# Patient Record
Sex: MALE | Race: WHITE | NOT HISPANIC OR LATINO | Employment: UNEMPLOYED | URBAN - METROPOLITAN AREA
[De-identification: names, ages, dates, MRNs, and addresses within clinical notes are randomized per-mention and may not be internally consistent; named-entity substitution may affect disease eponyms.]

---

## 2023-09-20 ENCOUNTER — APPOINTMENT (OUTPATIENT)
Dept: RADIOLOGY | Facility: MEDICAL CENTER | Age: 38
End: 2023-09-20
Attending: EMERGENCY MEDICINE
Payer: COMMERCIAL

## 2023-09-20 ENCOUNTER — HOSPITAL ENCOUNTER (OUTPATIENT)
Facility: MEDICAL CENTER | Age: 38
End: 2023-09-21
Attending: EMERGENCY MEDICINE | Admitting: STUDENT IN AN ORGANIZED HEALTH CARE EDUCATION/TRAINING PROGRAM
Payer: COMMERCIAL

## 2023-09-20 DIAGNOSIS — K92.2 UPPER GI BLEED: ICD-10-CM

## 2023-09-20 PROBLEM — I10 HYPERTENSION: Status: ACTIVE | Noted: 2023-09-20

## 2023-09-20 PROBLEM — R00.0 TACHYCARDIA: Status: ACTIVE | Noted: 2023-09-20

## 2023-09-20 PROBLEM — E05.90 HYPERTHYROIDISM: Status: ACTIVE | Noted: 2023-09-20

## 2023-09-20 LAB
ALBUMIN SERPL BCP-MCNC: 4.5 G/DL (ref 3.2–4.9)
ALBUMIN/GLOB SERPL: 1.4 G/DL
ALP SERPL-CCNC: 182 U/L (ref 30–99)
ALT SERPL-CCNC: 34 U/L (ref 2–50)
ANION GAP SERPL CALC-SCNC: 10 MMOL/L (ref 7–16)
AST SERPL-CCNC: 33 U/L (ref 12–45)
BASOPHILS # BLD AUTO: 0.4 % (ref 0–1.8)
BASOPHILS # BLD AUTO: 0.5 % (ref 0–1.8)
BASOPHILS # BLD: 0.02 K/UL (ref 0–0.12)
BASOPHILS # BLD: 0.03 K/UL (ref 0–0.12)
BILIRUB SERPL-MCNC: 0.7 MG/DL (ref 0.1–1.5)
BUN SERPL-MCNC: 30 MG/DL (ref 8–22)
CALCIUM ALBUM COR SERPL-MCNC: 9.4 MG/DL (ref 8.5–10.5)
CALCIUM SERPL-MCNC: 9.8 MG/DL (ref 8.5–10.5)
CHLORIDE SERPL-SCNC: 103 MMOL/L (ref 96–112)
CO2 SERPL-SCNC: 24 MMOL/L (ref 20–33)
CREAT SERPL-MCNC: 0.71 MG/DL (ref 0.5–1.4)
EOSINOPHIL # BLD AUTO: 0.13 K/UL (ref 0–0.51)
EOSINOPHIL # BLD AUTO: 0.17 K/UL (ref 0–0.51)
EOSINOPHIL NFR BLD: 2.4 % (ref 0–6.9)
EOSINOPHIL NFR BLD: 2.6 % (ref 0–6.9)
ERYTHROCYTE [DISTWIDTH] IN BLOOD BY AUTOMATED COUNT: 39.6 FL (ref 35.9–50)
ERYTHROCYTE [DISTWIDTH] IN BLOOD BY AUTOMATED COUNT: 39.6 FL (ref 35.9–50)
GFR SERPLBLD CREATININE-BSD FMLA CKD-EPI: 121 ML/MIN/1.73 M 2
GLOBULIN SER CALC-MCNC: 3.2 G/DL (ref 1.9–3.5)
GLUCOSE SERPL-MCNC: 106 MG/DL (ref 65–99)
HCT VFR BLD AUTO: 41.9 % (ref 42–52)
HCT VFR BLD AUTO: 45.5 % (ref 42–52)
HCT VFR BLD AUTO: 48 % (ref 42–52)
HGB BLD-MCNC: 14.3 G/DL (ref 14–18)
HGB BLD-MCNC: 14.8 G/DL (ref 14–18)
HGB BLD-MCNC: 16.6 G/DL (ref 14–18)
IMM GRANULOCYTES # BLD AUTO: 0.01 K/UL (ref 0–0.11)
IMM GRANULOCYTES # BLD AUTO: 0.01 K/UL (ref 0–0.11)
IMM GRANULOCYTES NFR BLD AUTO: 0.2 % (ref 0–0.9)
IMM GRANULOCYTES NFR BLD AUTO: 0.2 % (ref 0–0.9)
LIPASE SERPL-CCNC: 37 U/L (ref 11–82)
LYMPHOCYTES # BLD AUTO: 2.04 K/UL (ref 1–4.8)
LYMPHOCYTES # BLD AUTO: 2.84 K/UL (ref 1–4.8)
LYMPHOCYTES NFR BLD: 37.5 % (ref 22–41)
LYMPHOCYTES NFR BLD: 43.4 % (ref 22–41)
MCH RBC QN AUTO: 27.8 PG (ref 27–33)
MCH RBC QN AUTO: 28.2 PG (ref 27–33)
MCHC RBC AUTO-ENTMCNC: 34.1 G/DL (ref 32.3–36.5)
MCHC RBC AUTO-ENTMCNC: 34.6 G/DL (ref 32.3–36.5)
MCV RBC AUTO: 81.4 FL (ref 81.4–97.8)
MCV RBC AUTO: 81.6 FL (ref 81.4–97.8)
MONOCYTES # BLD AUTO: 0.43 K/UL (ref 0–0.85)
MONOCYTES # BLD AUTO: 0.6 K/UL (ref 0–0.85)
MONOCYTES NFR BLD AUTO: 7.9 % (ref 0–13.4)
MONOCYTES NFR BLD AUTO: 9.2 % (ref 0–13.4)
NEUTROPHILS # BLD AUTO: 2.81 K/UL (ref 1.82–7.42)
NEUTROPHILS # BLD AUTO: 2.89 K/UL (ref 1.82–7.42)
NEUTROPHILS NFR BLD: 44.1 % (ref 44–72)
NEUTROPHILS NFR BLD: 51.6 % (ref 44–72)
NRBC # BLD AUTO: 0 K/UL
NRBC # BLD AUTO: 0 K/UL
NRBC BLD-RTO: 0 /100 WBC (ref 0–0.2)
NRBC BLD-RTO: 0 /100 WBC (ref 0–0.2)
PLATELET # BLD AUTO: 158 K/UL (ref 164–446)
PLATELET # BLD AUTO: 195 K/UL (ref 164–446)
PMV BLD AUTO: 12 FL (ref 9–12.9)
PMV BLD AUTO: 12.3 FL (ref 9–12.9)
POTASSIUM SERPL-SCNC: 4.7 MMOL/L (ref 3.6–5.5)
PROT SERPL-MCNC: 7.7 G/DL (ref 6–8.2)
RBC # BLD AUTO: 5.15 M/UL (ref 4.7–6.1)
RBC # BLD AUTO: 5.88 M/UL (ref 4.7–6.1)
SODIUM SERPL-SCNC: 137 MMOL/L (ref 135–145)
WBC # BLD AUTO: 5.4 K/UL (ref 4.8–10.8)
WBC # BLD AUTO: 6.5 K/UL (ref 4.8–10.8)

## 2023-09-20 PROCEDURE — 83690 ASSAY OF LIPASE: CPT

## 2023-09-20 PROCEDURE — 74177 CT ABD & PELVIS W/CONTRAST: CPT

## 2023-09-20 PROCEDURE — 96374 THER/PROPH/DIAG INJ IV PUSH: CPT

## 2023-09-20 PROCEDURE — 80053 COMPREHEN METABOLIC PANEL: CPT

## 2023-09-20 PROCEDURE — 36415 COLL VENOUS BLD VENIPUNCTURE: CPT

## 2023-09-20 PROCEDURE — 85014 HEMATOCRIT: CPT

## 2023-09-20 PROCEDURE — C9113 INJ PANTOPRAZOLE SODIUM, VIA: HCPCS | Performed by: EMERGENCY MEDICINE

## 2023-09-20 PROCEDURE — 700105 HCHG RX REV CODE 258: Performed by: EMERGENCY MEDICINE

## 2023-09-20 PROCEDURE — 700117 HCHG RX CONTRAST REV CODE 255: Performed by: EMERGENCY MEDICINE

## 2023-09-20 PROCEDURE — 700111 HCHG RX REV CODE 636 W/ 250 OVERRIDE (IP): Performed by: EMERGENCY MEDICINE

## 2023-09-20 PROCEDURE — 700102 HCHG RX REV CODE 250 W/ 637 OVERRIDE(OP): Performed by: STUDENT IN AN ORGANIZED HEALTH CARE EDUCATION/TRAINING PROGRAM

## 2023-09-20 PROCEDURE — 96375 TX/PRO/DX INJ NEW DRUG ADDON: CPT

## 2023-09-20 PROCEDURE — 700105 HCHG RX REV CODE 258: Performed by: STUDENT IN AN ORGANIZED HEALTH CARE EDUCATION/TRAINING PROGRAM

## 2023-09-20 PROCEDURE — 85018 HEMOGLOBIN: CPT

## 2023-09-20 PROCEDURE — 99285 EMERGENCY DEPT VISIT HI MDM: CPT

## 2023-09-20 PROCEDURE — 770020 HCHG ROOM/CARE - TELE (206)

## 2023-09-20 PROCEDURE — 85025 COMPLETE CBC W/AUTO DIFF WBC: CPT | Mod: 91

## 2023-09-20 PROCEDURE — A9270 NON-COVERED ITEM OR SERVICE: HCPCS | Performed by: STUDENT IN AN ORGANIZED HEALTH CARE EDUCATION/TRAINING PROGRAM

## 2023-09-20 PROCEDURE — 99223 1ST HOSP IP/OBS HIGH 75: CPT | Performed by: STUDENT IN AN ORGANIZED HEALTH CARE EDUCATION/TRAINING PROGRAM

## 2023-09-20 RX ORDER — LOSARTAN POTASSIUM 50 MG/1
50 TABLET ORAL DAILY
COMMUNITY

## 2023-09-20 RX ORDER — METHIMAZOLE 5 MG/1
5 TABLET ORAL 3 TIMES DAILY
COMMUNITY

## 2023-09-20 RX ORDER — AMLODIPINE BESYLATE 10 MG/1
10 TABLET ORAL DAILY
COMMUNITY

## 2023-09-20 RX ORDER — BISACODYL 10 MG
10 SUPPOSITORY, RECTAL RECTAL
Status: DISCONTINUED | OUTPATIENT
Start: 2023-09-20 | End: 2023-09-21 | Stop reason: HOSPADM

## 2023-09-20 RX ORDER — HYDRALAZINE HYDROCHLORIDE 20 MG/ML
10 INJECTION INTRAMUSCULAR; INTRAVENOUS ONCE
Status: DISCONTINUED | OUTPATIENT
Start: 2023-09-20 | End: 2023-09-21

## 2023-09-20 RX ORDER — PANTOPRAZOLE SODIUM 40 MG/10ML
40 INJECTION, POWDER, LYOPHILIZED, FOR SOLUTION INTRAVENOUS 2 TIMES DAILY
Status: DISCONTINUED | OUTPATIENT
Start: 2023-09-21 | End: 2023-09-21 | Stop reason: HOSPADM

## 2023-09-20 RX ORDER — ONDANSETRON 2 MG/ML
4 INJECTION INTRAMUSCULAR; INTRAVENOUS ONCE
Status: COMPLETED | OUTPATIENT
Start: 2023-09-20 | End: 2023-09-20

## 2023-09-20 RX ORDER — OXYCODONE HYDROCHLORIDE 5 MG/1
5 TABLET ORAL EVERY 4 HOURS PRN
Status: DISCONTINUED | OUTPATIENT
Start: 2023-09-20 | End: 2023-09-21

## 2023-09-20 RX ORDER — HYDRALAZINE HYDROCHLORIDE 20 MG/ML
20 INJECTION INTRAMUSCULAR; INTRAVENOUS EVERY 6 HOURS PRN
Status: DISCONTINUED | OUTPATIENT
Start: 2023-09-20 | End: 2023-09-21

## 2023-09-20 RX ORDER — POLYETHYLENE GLYCOL 3350 17 G/17G
1 POWDER, FOR SOLUTION ORAL
Status: DISCONTINUED | OUTPATIENT
Start: 2023-09-20 | End: 2023-09-21 | Stop reason: HOSPADM

## 2023-09-20 RX ORDER — OMEPRAZOLE 20 MG/1
20 CAPSULE, DELAYED RELEASE ORAL DAILY
COMMUNITY

## 2023-09-20 RX ORDER — MORPHINE SULFATE 4 MG/ML
2 INJECTION INTRAVENOUS EVERY 4 HOURS PRN
Status: DISCONTINUED | OUTPATIENT
Start: 2023-09-20 | End: 2023-09-21

## 2023-09-20 RX ORDER — AMOXICILLIN 250 MG
2 CAPSULE ORAL 2 TIMES DAILY
Status: DISCONTINUED | OUTPATIENT
Start: 2023-09-20 | End: 2023-09-21 | Stop reason: HOSPADM

## 2023-09-20 RX ORDER — METHIMAZOLE 5 MG/1
5 TABLET ORAL EVERY 8 HOURS
Status: DISCONTINUED | OUTPATIENT
Start: 2023-09-20 | End: 2023-09-21 | Stop reason: HOSPADM

## 2023-09-20 RX ORDER — PROPRANOLOL HCL 60 MG
60 CAPSULE, EXTENDED RELEASE 24HR ORAL DAILY
COMMUNITY

## 2023-09-20 RX ORDER — SODIUM CHLORIDE, SODIUM LACTATE, POTASSIUM CHLORIDE, CALCIUM CHLORIDE 600; 310; 30; 20 MG/100ML; MG/100ML; MG/100ML; MG/100ML
INJECTION, SOLUTION INTRAVENOUS CONTINUOUS
Status: DISCONTINUED | OUTPATIENT
Start: 2023-09-20 | End: 2023-09-21

## 2023-09-20 RX ADMIN — ONDANSETRON 4 MG: 2 INJECTION INTRAMUSCULAR; INTRAVENOUS at 13:32

## 2023-09-20 RX ADMIN — IOHEXOL 100 ML: 350 INJECTION, SOLUTION INTRAVENOUS at 14:23

## 2023-09-20 RX ADMIN — FAMOTIDINE 20 MG: 10 INJECTION, SOLUTION INTRAVENOUS at 13:32

## 2023-09-20 RX ADMIN — METHIMAZOLE 5 MG: 5 TABLET ORAL at 19:14

## 2023-09-20 RX ADMIN — OXYCODONE HYDROCHLORIDE 5 MG: 5 TABLET ORAL at 20:36

## 2023-09-20 RX ADMIN — SODIUM CHLORIDE, POTASSIUM CHLORIDE, SODIUM LACTATE AND CALCIUM CHLORIDE: 600; 310; 30; 20 INJECTION, SOLUTION INTRAVENOUS at 19:13

## 2023-09-20 RX ADMIN — PANTOPRAZOLE SODIUM 80 MG: 40 INJECTION, POWDER, FOR SOLUTION INTRAVENOUS at 17:46

## 2023-09-20 ASSESSMENT — COGNITIVE AND FUNCTIONAL STATUS - GENERAL
SUGGESTED CMS G CODE MODIFIER MOBILITY: CH
MOBILITY SCORE: 24
SUGGESTED CMS G CODE MODIFIER DAILY ACTIVITY: CH
DAILY ACTIVITIY SCORE: 24

## 2023-09-20 ASSESSMENT — ENCOUNTER SYMPTOMS
VOMITING: 1
DIARRHEA: 0
FLANK PAIN: 0
BACK PAIN: 0
NECK PAIN: 0
ABDOMINAL PAIN: 1
INSOMNIA: 0
DOUBLE VISION: 0
BLOOD IN STOOL: 1
EYE PAIN: 0
CHILLS: 0
NERVOUS/ANXIOUS: 0
DEPRESSION: 0
FEVER: 0
DIZZINESS: 0
WEAKNESS: 0
PHOTOPHOBIA: 0
SINUS PAIN: 0
ORTHOPNEA: 0
NAUSEA: 1
HEADACHES: 0
HALLUCINATIONS: 0
PALPITATIONS: 0
BLURRED VISION: 0

## 2023-09-20 ASSESSMENT — LIFESTYLE VARIABLES
TOTAL SCORE: 0
HAVE PEOPLE ANNOYED YOU BY CRITICIZING YOUR DRINKING: NO
ALCOHOL_USE: NO
EVER HAD A DRINK FIRST THING IN THE MORNING TO STEADY YOUR NERVES TO GET RID OF A HANGOVER: NO
TOTAL SCORE: 0
HAVE YOU EVER FELT YOU SHOULD CUT DOWN ON YOUR DRINKING: NO
CONSUMPTION TOTAL: INCOMPLETE
SUBSTANCE_ABUSE: 0
TOTAL SCORE: 0
EVER FELT BAD OR GUILTY ABOUT YOUR DRINKING: NO
DOES PATIENT WANT TO STOP DRINKING: CANNOT ASSESS

## 2023-09-20 ASSESSMENT — PATIENT HEALTH QUESTIONNAIRE - PHQ9
1. LITTLE INTEREST OR PLEASURE IN DOING THINGS: NOT AT ALL
SUM OF ALL RESPONSES TO PHQ9 QUESTIONS 1 AND 2: 0
2. FEELING DOWN, DEPRESSED, IRRITABLE, OR HOPELESS: NOT AT ALL

## 2023-09-20 ASSESSMENT — PAIN DESCRIPTION - PAIN TYPE
TYPE: ACUTE PAIN

## 2023-09-20 ASSESSMENT — FIBROSIS 4 INDEX: FIB4 SCORE: 1.33

## 2023-09-20 NOTE — ED NOTES
Bedside report from PAIGE RN. Pt resting in [facility] comfortably, on monitor, call light in reach.  Pt is on RA, GCS 15. long-term security officers at bedside.

## 2023-09-20 NOTE — ASSESSMENT & PLAN NOTE
This is the patient's second hospital admission for acute GI bleed.  Patient had an extensive stay at [place] last week.  History as per above in HPI.  GI consulted and aware, plan for scope tomorrow  Pantoprazole 40mg IV BID  H/H [id number] to monitor   Transfuse for hemoglobin < 7.0  Keep NPO  Maintenance IVF

## 2023-09-20 NOTE — ASSESSMENT & PLAN NOTE
Patient presents with a systolic blood pressure between the 160s to 200s.  Hydralazine as needed ordered.  Giving 1 dose stat right now.  Patient was diagnosed with hypertension at outside facility.  He was started on amlodipine 10 mg tablet daily, losartan 50 mg tablets  In the setting of an acute GI bleed, holding off on patient's antihypertensive medications.  Day team to resume when appropriate

## 2023-09-20 NOTE — ED TRIAGE NOTES
"Chief Complaint   Patient presents with    Blood in Vomit     Pt BIB law enforcement from nursing home. Pt started vomiting eun blood Wednesday and has not stopped. Pt was seen at [facility] for same but was \"unable to find anything\" per pt.     BP (!) 202/149   Pulse (!) 104   Temp 35.8 °C (96.5 °F) (Temporal)   Resp 18   Ht 1.829 m (6')   Wt 79.4 kg (175 lb)   SpO2 99%   BMI 23.73 kg/m²     "

## 2023-09-20 NOTE — ED NOTES
Med Rec COMPLETE per DISCHARGE RX ORDER FORM  Allergies Reviewed  Antibiotcs in past 30 days:NOT OUTSIDE HOSPISTAL  Anticoagulant in past 14 days:NO    Pt states he does not take any RX medications at the [facility]    However pt was prescribed medications that have not been dispensed and picked up

## 2023-09-20 NOTE — ED PROVIDER NOTES
"ED Provider Note    CHIEF COMPLAINT  Chief Complaint   Patient presents with    Blood in Vomit     Pt BIB law enforcement from care home. Pt started vomiting eun blood Wednesday and has not stopped. Pt was seen at [facility] for same but was \"unable to find anything\" per pt.       EXTERNAL RECORDS REVIEWED  None available    HPI/ROS  LIMITATION TO HISTORY   None  OUTSIDE HISTORIAN(S):  Accompanying officers provided additional history    Johnathan Carter is a 37 y.o. male who presents for evaluation of reported hematemesis, epigastric pain nausea and vomiting.  Patient is apparently incarcerated at a federal care home in [facility].  Per the officer's report he was hospitalized at [facility] last week.  He underwent extensive evaluation including apparent serial blood test, CT scan of the abdomen pelvis and by their report upper endoscopy which did reveal a gastric diverticulum but no heavy bleeding. he apparently presented there with eun hematemesis.  He was stable for several days and had another episode and therefore was transferred here for higher level of care.  Patient apparently has no known medical or surgical history.  He has no history of extensive alcohol abuse.  He specifically denies any history of aspirin or anticoagulant use.  He does report diffuse 5 out of 10 left upper quadrant discomfort.  He does report dark-colored stool    PAST MEDICAL HISTORY   History of hematemesis    SURGICAL HISTORY  patient denies any surgical history  History of upper endoscopy last week  FAMILY HISTORY  History reviewed. No pertinent family history.  Noncontributory  SOCIAL HISTORY  Social History     Tobacco Use    Smoking status: Former     Types: Cigarettes    Smokeless tobacco: Never   Vaping Use    Vaping Use: Never used   Substance and Sexual Activity    Alcohol use: Not Currently    Drug use: Not Currently    Sexual activity: Not on file       CURRENT MEDICATIONS  Home Medications       Reviewed by Garland FERNANDEZ" CANELO Lucas (Registered Nurse) on 09/20/23 at 1236  Med List Status: Partial     Medication Last Dose Status        Patient Alexis Taking any Medications                           ALLERGIES  No Known Allergies    PHYSICAL EXAM  VITAL SIGNS: BP (!) 160/114   Pulse 93   Temp 35.8 °C (96.5 °F) (Temporal)   Resp 16   Ht 1.829 m (6')   Wt 79.4 kg (175 lb)   SpO2 95%   BMI 23.73 kg/m²    Pulse ox interpretation:I interpret this pulse ox as normal.  Constitutional: Alert and oriented x 3, no acute distress  HEENT: Atraumatic normocephalic, pupils are equal round reactive to light extraocular movements are intact. The nares is clear, external ears are normal, mouth shows moist mucous membranes normal dentition for age  Neck: Supple, no JVD no tracheal deviation  Cardiovascular: Mild tachycardia no murmur rub or gallop 2+ pulses peripherally x4  Thorax & Lungs: No respiratory distress, no wheezes rales or rhonchi, No chest tenderness.   GI: Soft diffuse mild epigastric discomfort in the left side no palpable hernias or masses no peritoneal signs  Skin: Warm dry no acute rash or lesion  Musculoskeletal: Moving all extremities with full range and 5 of 5 strength no acute  deformity  Neurologic: Cranial nerves III through XII are grossly intact no sensory deficit no cerebellar dysfunction   Psychiatric: Anxious        DIAGNOSTIC STUDIES / PROCEDURES      LABS  Results for orders placed or performed during the hospital encounter of 09/20/23   CBC WITH DIFFERENTIAL   Result Value Ref Range    WBC 6.5 4.8 - 10.8 K/uL    RBC 5.88 4.70 - 6.10 M/uL    Hemoglobin 16.6 14.0 - 18.0 g/dL    Hematocrit 48.0 42.0 - 52.0 %    MCV 81.6 81.4 - 97.8 fL    MCH 28.2 27.0 - 33.0 pg    MCHC 34.6 32.3 - 36.5 g/dL    RDW 39.6 35.9 - 50.0 fL    Platelet Count 195 164 - 446 K/uL    MPV 12.0 9.0 - 12.9 fL    Neutrophils-Polys 44.10 44.00 - 72.00 %    Lymphocytes 43.40 (H) 22.00 - 41.00 %    Monocytes 9.20 0.00 - 13.40 %    Eosinophils 2.60 0.00 -  6.90 %    Basophils 0.50 0.00 - 1.80 %    Immature Granulocytes 0.20 0.00 - 0.90 %    Nucleated RBC 0.00 0.00 - 0.20 /100 WBC    Neutrophils (Absolute) 2.89 1.82 - 7.42 K/uL    Lymphs (Absolute) 2.84 1.00 - 4.80 K/uL    Monos (Absolute) 0.60 0.00 - 0.85 K/uL    Eos (Absolute) 0.17 0.00 - 0.51 K/uL    Baso (Absolute) 0.03 0.00 - 0.12 K/uL    Immature Granulocytes (abs) 0.01 0.00 - 0.11 K/uL    NRBC (Absolute) 0.00 K/uL   Comp Metabolic Panel   Result Value Ref Range    Sodium 137 135 - 145 mmol/L    Potassium 4.7 3.6 - 5.5 mmol/L    Chloride 103 96 - 112 mmol/L    Co2 24 20 - 33 mmol/L    Anion Gap 10.0 7.0 - 16.0    Glucose 106 (H) 65 - 99 mg/dL    Bun 30 (H) 8 - 22 mg/dL    Creatinine 0.71 0.50 - 1.40 mg/dL    Calcium 9.8 8.5 - 10.5 mg/dL    Correct Calcium 9.4 8.5 - 10.5 mg/dL    AST(SGOT) 33 12 - 45 U/L    ALT(SGPT) 34 2 - 50 U/L    Alkaline Phosphatase 182 (H) 30 - 99 U/L    Total Bilirubin 0.7 0.1 - 1.5 mg/dL    Albumin 4.5 3.2 - 4.9 g/dL    Total Protein 7.7 6.0 - 8.2 g/dL    Globulin 3.2 1.9 - 3.5 g/dL    A-G Ratio 1.4 g/dL   LIPASE   Result Value Ref Range    Lipase 37 11 - 82 U/L   ESTIMATED GFR   Result Value Ref Range    GFR (CKD-EPI) 121 >60 mL/min/1.73 m 2   CBC WITH DIFFERENTIAL   Result Value Ref Range    WBC 5.4 4.8 - 10.8 K/uL    RBC 5.15 4.70 - 6.10 M/uL    Hemoglobin 14.3 14.0 - 18.0 g/dL    Hematocrit 41.9 (L) 42.0 - 52.0 %    MCV 81.4 81.4 - 97.8 fL    MCH 27.8 27.0 - 33.0 pg    MCHC 34.1 32.3 - 36.5 g/dL    RDW 39.6 35.9 - 50.0 fL    Platelet Count 158 (L) 164 - 446 K/uL    MPV 12.3 9.0 - 12.9 fL    Neutrophils-Polys 51.60 44.00 - 72.00 %    Lymphocytes 37.50 22.00 - 41.00 %    Monocytes 7.90 0.00 - 13.40 %    Eosinophils 2.40 0.00 - 6.90 %    Basophils 0.40 0.00 - 1.80 %    Immature Granulocytes 0.20 0.00 - 0.90 %    Nucleated RBC 0.00 0.00 - 0.20 /100 WBC    Neutrophils (Absolute) 2.81 1.82 - 7.42 K/uL    Lymphs (Absolute) 2.04 1.00 - 4.80 K/uL    Monos (Absolute) 0.43 0.00 - 0.85 K/uL    Eos  (Absolute) 0.13 0.00 - 0.51 K/uL    Baso (Absolute) 0.02 0.00 - 0.12 K/uL    Immature Granulocytes (abs) 0.01 0.00 - 0.11 K/uL    NRBC (Absolute) 0.00 K/uL         RADIOLOGY  I have independently interpreted the diagnostic imaging associated with this visit and am waiting the final reading from the radiologist.   My preliminary interpretation is as follows: No acute process  Radiologist interpretation:   CT-ABDOMEN-PELVIS WITH   Final Result      1.  CT of the abdomen and pelvis within normal limits. No acute abdominal or pelvic disease process is evident.          COURSE & MEDICAL DECISION MAKING    ED Observation Status? Yes; I am placing the patient in to an observation status due to a diagnostic uncertainty as well as therapeutic intensity. Patient placed in observation status at 1:10 PM, 9/20/2023.     Observation plan is as follows: Establish an IV, administer nausea medication and IV Pepcid perform serial hemoglobins as well as imaging studies reviewing case with gastroenterology    Upon Reevaluation, the patient's condition has: not improved; and will be escalated to hospitalization.    Patient discharged from ED Observation status at 1615 (Time) 9/20 (Date).     INITIAL ASSESSMENT, COURSE AND PLAN  Care Narrative:  This is a very pleasant 37-year-old gentleman who was brought in from a [facility] for hematemesis and apparent ongoing GI bleeding.  We were able to obtain records from [facility] but still are awaiting the definitive detailed upper endoscopy report.  Apparently he was admitted there last week underwent CT scan, CT angiogram of the abdomen which was unremarkable.  His hemoglobin remained stable.  He apparently underwent upper endoscopy revealing a gastric diverticulum without active bleeding.  Patient apparently developed some epigastric pain and had some coffee-ground emesis as well as melena.  He was transferred here for higher level of care.  Here he had an extensive evaluation.   His initial hemoglobin is reassuring and high at 16 and CT scan was normal he did have on some ongoing discomfort.  We repeated his hemoglobin without any IV fluids previously given and it did drop around 2.3units.  I reviewed the case with Dr. Dee with GI.  She thinks he may be rebleeding from his gastric diverticulum.  She has recommended hospitalization with the hospitalist service n.p.o. at midnight in anticipation for likely repeat upper endoscopy tomorrow      ADDITIONAL PROBLEM LIST    DISPOSITION AND DISCUSSIONS  I have discussed management of the patient with the following physicians and LITO's: Discussed with Dr. Dee as well as hospitalist    Discussion of management with other QHP or appropriate source(s): None    Escalation of care considered, and ultimately not performed: Consider blood transfusion    Barriers to care at this time, including but not limited to: None    Decision tools and prescription drugs considered including, but not limited to: None    FINAL DIAGNOSIS  Acute upper gastrointestinal hemorrhage       Electronically signed by: Fausto Rondon M.D., 9/20/2023 1:22 PM

## 2023-09-20 NOTE — H&P
"Hospital Medicine History & Physical Note    Date of Service  9/20/2023    Primary Care Physician  No primary care provider on file.    Consultants  GI    Specialist Names: Matteoni    Code Status  Full Code    Chief Complaint  Chief Complaint   Patient presents with    Blood in Vomit     Pt BIB law enforcement from [facility]. Pt started vomiting eun blood Wednesday and has not stopped. Pt was seen at [facility] for same but was \"unable to find anything\" per pt.       History of Presenting Illness  Johnathan Carter is a 37 y.o. male with past medical history of hypertension, hyper thyroidism, recent hospital admission for any acute bleeding gastric diverticulum who presented 9/20/2023 with hematemesis.  According the patient, the patient Was experiencing 1 day history of left-sided abdominal pain that was rating to his back.  He approximately 5 episodes of hematemesis prior to her prior fall.  States that he was coughing up a lot of black clots.  States that during his stay in the emergency department, he was having right bloody bouts of emesis.  He denies any melena, however he did notice some streaks of blood when wiping.  Patient denies any chronic NSAID use.  Denies any history of alcohol abuse.  Denies a history of peptic ulcer disease. Patient went to [facility] last week ( was admitted from last wedneday to this Monday).   Notes from [facility] indicate that he was admited for hematemesis. CT chest was negative for acute process. CT abdomen reealed a gsatric fundus diverticulum. GI was consulted at that time and th patient was found to have a non bleeding gastric diverticulum. Patient was about to get discharged but developed hematemesis again. CTA abdomen was obtained which was negative. Hemoglobin had remained stable despite large abouts of emesis. GI took the patient for repeat EGD and abnormal pink glenatinous material was seen in the fundus without any evidence of bleeding raising suspicionn for exogenous " capsule ingestion. At that time, he was noted to have hyperthrydoisim with TSH undetectable and elevated FT4 of 2.2. Patient was started on propranolol and methimazole. He was noted ot by hypertensive and started on losartan and amlodipine. Cherry has yet to receive these medicaitons.     Patient was worked up in the emergency department.  He did have a drop in his hemoglobin while in the emergency department.  He had several episodes of hematemesis as well.  Currently hemodynamically stable, however he remains to be hypertensive.  ER physician has consulted with GI service on call.  Dr. Randolph recommend PPI, and EGD tomorrow.       I discussed the plan of care with patient.    Review of Systems  Review of Systems   Constitutional:  Negative for chills and fever.   HENT:  Negative for congestion, nosebleeds and sinus pain.    Eyes:  Negative for blurred vision, double vision, photophobia and pain.   Cardiovascular:  Negative for chest pain, palpitations and orthopnea.   Gastrointestinal:  Positive for abdominal pain, blood in stool, nausea and vomiting. Negative for diarrhea and melena.   Genitourinary:  Negative for dysuria, flank pain, frequency, hematuria and urgency.   Musculoskeletal:  Negative for back pain and neck pain.   Skin:  Negative for rash.   Neurological:  Negative for dizziness, weakness and headaches.   Psychiatric/Behavioral:  Negative for depression, hallucinations, substance abuse and suicidal ideas. The patient is not nervous/anxious and does not have insomnia.        Past Medical History   has no past medical history on file.    Surgical History   has no past surgical history on file.     Family History  family history is not on file.   Family history reviewed with patient. There is no family history that is pertinent to the chief complaint.     Social History   reports that he has quit smoking. His smoking use included cigarettes. He has never used smokeless tobacco. He reports that he  does not currently use alcohol. He reports that he does not currently use drugs.    Allergies  Allergies   Allergen Reactions    Peanut Oil        Medications  None       Physical Exam  Temp:  [35.8 °C (96.5 °F)] 35.8 °C (96.5 °F)  Pulse:  [] 93  Resp:  [16-18] 16  BP: (152-202)/() 160/114  SpO2:  [93 %-99 %] 95 %  Blood Pressure: (!) 160/114   Temperature: 35.8 °C (96.5 °F)   Pulse: 93   Respiration: 16   Pulse Oximetry: 95 %       Physical Exam  Constitutional:       General: He is not in acute distress.     Appearance: Normal appearance. He is normal weight. He is not ill-appearing, toxic-appearing or diaphoretic.   HENT:      Head: Normocephalic and atraumatic.      Mouth/Throat:      Mouth: Mucous membranes are moist.   Eyes:      Pupils: Pupils are equal, round, and reactive to light.   Cardiovascular:      Rate and Rhythm: Normal rate and regular rhythm.      Pulses: Normal pulses.      Heart sounds: Normal heart sounds. No murmur heard.     No friction rub. No gallop.   Pulmonary:      Effort: Pulmonary effort is normal. No respiratory distress.      Breath sounds: Normal breath sounds. No stridor. No wheezing, rhonchi or rales.   Chest:      Chest wall: No tenderness.   Abdominal:      General: There is no distension.      Palpations: There is no mass.      Tenderness: There is no abdominal tenderness. There is no right CVA tenderness, left CVA tenderness, guarding or rebound.      Hernia: No hernia is present.   Musculoskeletal:         General: No swelling, tenderness, deformity or signs of injury.      Right lower leg: No edema.      Left lower leg: No edema.   Skin:     General: Skin is warm and dry.      Capillary Refill: Capillary refill takes less than 2 seconds.      Coloration: Skin is not jaundiced or pale.      Findings: No bruising, erythema, lesion or rash.   Neurological:      General: No focal deficit present.      Mental Status: He is alert and oriented to person, place, and  "time. Mental status is at baseline.      Cranial Nerves: No cranial nerve deficit.      Sensory: No sensory deficit.      Motor: No weakness.      Coordination: Coordination normal.      Gait: Gait normal.      Deep Tendon Reflexes: Reflexes normal.   Psychiatric:         Mood and Affect: Mood normal.         Laboratory:  Recent Labs     09/20/23  1239 09/20/23  1540   WBC 6.5 5.4   RBC 5.88 5.15   HEMOGLOBIN 16.6 14.3   HEMATOCRIT 48.0 41.9*   MCV 81.6 81.4   MCH 28.2 27.8   MCHC 34.6 34.1   RDW 39.6 39.6   PLATELETCT 195 158*   MPV 12.0 12.3     Recent Labs     09/20/23  1239   SODIUM 137   POTASSIUM 4.7   CHLORIDE 103   CO2 24   GLUCOSE 106*   BUN 30*   CREATININE 0.71   CALCIUM 9.8     Recent Labs     09/20/23  1239   ALTSGPT 34   ASTSGOT 33   ALKPHOSPHAT 182*   TBILIRUBIN 0.7   LIPASE 37   GLUCOSE 106*         No results for input(s): \"NTPROBNP\" in the last 72 hours.      No results for input(s): \"TROPONINT\" in the last 72 hours.    Imaging:  CT-ABDOMEN-PELVIS WITH   Final Result      1.  CT of the abdomen and pelvis within normal limits. No acute abdominal or pelvic disease process is evident.            Assessment/Plan:  Justification for Admission Status  I anticipate this patient will require at least two midnights for appropriate medical management, necessitating inpatient admission because management of upper GI bleed, requiring every 4 hour monitoring of patient's hemoglobin levels.  Patient will need a GI consult with likely EGD in the morning.    Patient will need a Med/Surg bed on EMERGENCY service .  The need is secondary to GI bleed.    * GI bleed- (present on admission)  Assessment & Plan  This is the patient's second hospital admission for acute GI bleed.  Patient had an extensive stay at [facility] last week.  History as per above in HPI.  GI consulted and aware, plan for scope tomorrow  Pantoprazole 40mg IV BID  H/H [id number] to monitor   Transfuse for hemoglobin < 7.0  Keep NPO  Maintenance " IVF      Hyperthyroidism  Assessment & Plan  Diagnosed at outside facility.  Patient was started on methimazole and propranolol.  Holding off on propranolol at this time the setting of acute GI bleed.  Resuming methimazole 5 mg tablet daily.  Follow-up with TSH levels    Hypertension  Assessment & Plan  Patient presents with a systolic blood pressure between the 160s to 200s.  Hydralazine as needed ordered.  Giving 1 dose stat right now.  Patient was diagnosed with hypertension at outside facility.  He was started on amlodipine 10 mg tablet daily, losartan 50 mg tablets  In the setting of an acute GI bleed, holding off on patient's antihypertensive medications.  Day team to resume when appropriate    Tachycardia  Assessment & Plan  Secondary to acute GI bleed  Monitor H/H  Continue with IVF        VTE prophylaxis: pharmacologic prophylaxis contraindicated due to acute bleed

## 2023-09-20 NOTE — ASSESSMENT & PLAN NOTE
Diagnosed at outside facility.  Patient was started on methimazole and propranolol.  Holding off on propranolol at this time the setting of acute GI bleed.  Resuming methimazole 5 mg tablet daily.  Follow-up with TSH levels

## 2023-09-21 ENCOUNTER — ANESTHESIA EVENT (OUTPATIENT)
Dept: SURGERY | Facility: MEDICAL CENTER | Age: 38
End: 2023-09-21
Payer: COMMERCIAL

## 2023-09-21 ENCOUNTER — ANESTHESIA (OUTPATIENT)
Dept: SURGERY | Facility: MEDICAL CENTER | Age: 38
End: 2023-09-21
Payer: COMMERCIAL

## 2023-09-21 VITALS
SYSTOLIC BLOOD PRESSURE: 146 MMHG | TEMPERATURE: 98.1 F | HEART RATE: 89 BPM | OXYGEN SATURATION: 96 % | HEIGHT: 72 IN | RESPIRATION RATE: 18 BRPM | WEIGHT: 174.82 LBS | BODY MASS INDEX: 23.68 KG/M2 | DIASTOLIC BLOOD PRESSURE: 96 MMHG

## 2023-09-21 PROBLEM — R00.0 TACHYCARDIA: Status: RESOLVED | Noted: 2023-09-20 | Resolved: 2023-09-21

## 2023-09-21 PROBLEM — J39.2 OROPHARYNGEAL BLEEDING: Status: ACTIVE | Noted: 2023-09-20

## 2023-09-21 PROBLEM — I15.2 HYPERTENSION DUE TO ENDOCRINE DISORDER: Status: ACTIVE | Noted: 2023-09-20

## 2023-09-21 LAB
ALBUMIN SERPL BCP-MCNC: 3.7 G/DL (ref 3.2–4.9)
ALBUMIN/GLOB SERPL: 1.4 G/DL
ALP SERPL-CCNC: 151 U/L (ref 30–99)
ALT SERPL-CCNC: 25 U/L (ref 2–50)
ANION GAP SERPL CALC-SCNC: 9 MMOL/L (ref 7–16)
AST SERPL-CCNC: 23 U/L (ref 12–45)
BASOPHILS # BLD AUTO: 0.3 % (ref 0–1.8)
BASOPHILS # BLD: 0.02 K/UL (ref 0–0.12)
BILIRUB SERPL-MCNC: 0.4 MG/DL (ref 0.1–1.5)
BUN SERPL-MCNC: 33 MG/DL (ref 8–22)
CALCIUM ALBUM COR SERPL-MCNC: 9.2 MG/DL (ref 8.5–10.5)
CALCIUM SERPL-MCNC: 9 MG/DL (ref 8.5–10.5)
CHLORIDE SERPL-SCNC: 105 MMOL/L (ref 96–112)
CO2 SERPL-SCNC: 25 MMOL/L (ref 20–33)
CREAT SERPL-MCNC: 0.76 MG/DL (ref 0.5–1.4)
EOSINOPHIL # BLD AUTO: 0.19 K/UL (ref 0–0.51)
EOSINOPHIL NFR BLD: 3 % (ref 0–6.9)
ERYTHROCYTE [DISTWIDTH] IN BLOOD BY AUTOMATED COUNT: 39.8 FL (ref 35.9–50)
GFR SERPLBLD CREATININE-BSD FMLA CKD-EPI: 118 ML/MIN/1.73 M 2
GLOBULIN SER CALC-MCNC: 2.6 G/DL (ref 1.9–3.5)
GLUCOSE SERPL-MCNC: 93 MG/DL (ref 65–99)
HCT VFR BLD AUTO: 41.1 % (ref 42–52)
HCT VFR BLD AUTO: 41.2 % (ref 42–52)
HGB BLD-MCNC: 13.9 G/DL (ref 14–18)
HGB BLD-MCNC: 14.1 G/DL (ref 14–18)
IMM GRANULOCYTES # BLD AUTO: 0.01 K/UL (ref 0–0.11)
IMM GRANULOCYTES NFR BLD AUTO: 0.2 % (ref 0–0.9)
LYMPHOCYTES # BLD AUTO: 3.31 K/UL (ref 1–4.8)
LYMPHOCYTES NFR BLD: 52.5 % (ref 22–41)
MAGNESIUM SERPL-MCNC: 1.9 MG/DL (ref 1.5–2.5)
MCH RBC QN AUTO: 27.8 PG (ref 27–33)
MCHC RBC AUTO-ENTMCNC: 33.8 G/DL (ref 32.3–36.5)
MCV RBC AUTO: 82.2 FL (ref 81.4–97.8)
MONOCYTES # BLD AUTO: 0.58 K/UL (ref 0–0.85)
MONOCYTES NFR BLD AUTO: 9.2 % (ref 0–13.4)
NEUTROPHILS # BLD AUTO: 2.2 K/UL (ref 1.82–7.42)
NEUTROPHILS NFR BLD: 34.8 % (ref 44–72)
NRBC # BLD AUTO: 0 K/UL
NRBC BLD-RTO: 0 /100 WBC (ref 0–0.2)
PHOSPHATE SERPL-MCNC: 4.4 MG/DL (ref 2.5–4.5)
PLATELET # BLD AUTO: 163 K/UL (ref 164–446)
PMV BLD AUTO: 12.2 FL (ref 9–12.9)
POTASSIUM SERPL-SCNC: 4.2 MMOL/L (ref 3.6–5.5)
PROT SERPL-MCNC: 6.3 G/DL (ref 6–8.2)
RBC # BLD AUTO: 5 M/UL (ref 4.7–6.1)
SODIUM SERPL-SCNC: 139 MMOL/L (ref 135–145)
T4 FREE SERPL-MCNC: 2.18 NG/DL (ref 0.93–1.7)
TSH SERPL DL<=0.005 MIU/L-ACNC: <0.005 UIU/ML (ref 0.38–5.33)
WBC # BLD AUTO: 6.3 K/UL (ref 4.8–10.8)

## 2023-09-21 PROCEDURE — 700111 HCHG RX REV CODE 636 W/ 250 OVERRIDE (IP): Performed by: STUDENT IN AN ORGANIZED HEALTH CARE EDUCATION/TRAINING PROGRAM

## 2023-09-21 PROCEDURE — A9270 NON-COVERED ITEM OR SERVICE: HCPCS | Performed by: STUDENT IN AN ORGANIZED HEALTH CARE EDUCATION/TRAINING PROGRAM

## 2023-09-21 PROCEDURE — 85025 COMPLETE CBC W/AUTO DIFF WBC: CPT

## 2023-09-21 PROCEDURE — 700105 HCHG RX REV CODE 258: Performed by: ANESTHESIOLOGY

## 2023-09-21 PROCEDURE — 99239 HOSP IP/OBS DSCHRG MGMT >30: CPT | Performed by: STUDENT IN AN ORGANIZED HEALTH CARE EDUCATION/TRAINING PROGRAM

## 2023-09-21 PROCEDURE — 700102 HCHG RX REV CODE 250 W/ 637 OVERRIDE(OP): Performed by: STUDENT IN AN ORGANIZED HEALTH CARE EDUCATION/TRAINING PROGRAM

## 2023-09-21 PROCEDURE — 85014 HEMATOCRIT: CPT

## 2023-09-21 PROCEDURE — C9113 INJ PANTOPRAZOLE SODIUM, VIA: HCPCS | Performed by: STUDENT IN AN ORGANIZED HEALTH CARE EDUCATION/TRAINING PROGRAM

## 2023-09-21 PROCEDURE — 160035 HCHG PACU - 1ST 60 MINS PHASE I: Performed by: INTERNAL MEDICINE

## 2023-09-21 PROCEDURE — 84100 ASSAY OF PHOSPHORUS: CPT

## 2023-09-21 PROCEDURE — 700111 HCHG RX REV CODE 636 W/ 250 OVERRIDE (IP): Performed by: ANESTHESIOLOGY

## 2023-09-21 PROCEDURE — 160048 HCHG OR STATISTICAL LEVEL 1-5: Performed by: INTERNAL MEDICINE

## 2023-09-21 PROCEDURE — 160009 HCHG ANES TIME/MIN: Performed by: INTERNAL MEDICINE

## 2023-09-21 PROCEDURE — 85018 HEMOGLOBIN: CPT

## 2023-09-21 PROCEDURE — 160002 HCHG RECOVERY MINUTES (STAT): Performed by: INTERNAL MEDICINE

## 2023-09-21 PROCEDURE — 36415 COLL VENOUS BLD VENIPUNCTURE: CPT

## 2023-09-21 PROCEDURE — 84443 ASSAY THYROID STIM HORMONE: CPT

## 2023-09-21 PROCEDURE — 160202 HCHG ENDO MINUTES - 1ST 30 MINS LEVEL 3: Performed by: INTERNAL MEDICINE

## 2023-09-21 PROCEDURE — 84439 ASSAY OF FREE THYROXINE: CPT

## 2023-09-21 PROCEDURE — 99204 OFFICE O/P NEW MOD 45 MIN: CPT | Mod: 25 | Performed by: INTERNAL MEDICINE

## 2023-09-21 PROCEDURE — G0378 HOSPITAL OBSERVATION PER HR: HCPCS

## 2023-09-21 PROCEDURE — 43235 EGD DIAGNOSTIC BRUSH WASH: CPT | Performed by: INTERNAL MEDICINE

## 2023-09-21 PROCEDURE — 80053 COMPREHEN METABOLIC PANEL: CPT

## 2023-09-21 PROCEDURE — 83735 ASSAY OF MAGNESIUM: CPT

## 2023-09-21 RX ORDER — PROPRANOLOL HYDROCHLORIDE 20 MG/1
20 TABLET ORAL 3 TIMES DAILY
Status: DISCONTINUED | OUTPATIENT
Start: 2023-09-21 | End: 2023-09-21 | Stop reason: HOSPADM

## 2023-09-21 RX ORDER — LABETALOL HYDROCHLORIDE 5 MG/ML
INJECTION, SOLUTION INTRAVENOUS PRN
Status: DISCONTINUED | OUTPATIENT
Start: 2023-09-21 | End: 2023-09-21 | Stop reason: SURG

## 2023-09-21 RX ORDER — SODIUM CHLORIDE, SODIUM LACTATE, POTASSIUM CHLORIDE, CALCIUM CHLORIDE 600; 310; 30; 20 MG/100ML; MG/100ML; MG/100ML; MG/100ML
INJECTION, SOLUTION INTRAVENOUS
Status: DISCONTINUED | OUTPATIENT
Start: 2023-09-21 | End: 2023-09-21 | Stop reason: SURG

## 2023-09-21 RX ADMIN — PANTOPRAZOLE SODIUM 40 MG: 40 INJECTION, POWDER, FOR SOLUTION INTRAVENOUS at 05:15

## 2023-09-21 RX ADMIN — PROPRANOLOL HYDROCHLORIDE 20 MG: 20 TABLET ORAL at 11:42

## 2023-09-21 RX ADMIN — PROPOFOL 50 MG: 10 INJECTION, EMULSION INTRAVENOUS at 13:08

## 2023-09-21 RX ADMIN — OXYCODONE HYDROCHLORIDE 5 MG: 5 TABLET ORAL at 08:14

## 2023-09-21 RX ADMIN — SODIUM CHLORIDE, POTASSIUM CHLORIDE, SODIUM LACTATE AND CALCIUM CHLORIDE: 600; 310; 30; 20 INJECTION, SOLUTION INTRAVENOUS at 12:53

## 2023-09-21 RX ADMIN — MORPHINE SULFATE 2 MG: 4 INJECTION, SOLUTION INTRAMUSCULAR; INTRAVENOUS at 04:47

## 2023-09-21 RX ADMIN — PROPOFOL 50 MG: 10 INJECTION, EMULSION INTRAVENOUS at 13:13

## 2023-09-21 RX ADMIN — PROPOFOL 50 MG: 10 INJECTION, EMULSION INTRAVENOUS at 13:05

## 2023-09-21 RX ADMIN — LABETALOL HYDROCHLORIDE 10 MG: 5 INJECTION, SOLUTION INTRAVENOUS at 13:03

## 2023-09-21 RX ADMIN — OXYCODONE HYDROCHLORIDE 5 MG: 5 TABLET ORAL at 03:30

## 2023-09-21 RX ADMIN — PROPOFOL 50 MG: 10 INJECTION, EMULSION INTRAVENOUS at 13:02

## 2023-09-21 ASSESSMENT — PAIN DESCRIPTION - PAIN TYPE
TYPE: SURGICAL PAIN
TYPE: SURGICAL PAIN
TYPE: ACUTE PAIN
TYPE: SURGICAL PAIN
TYPE: ACUTE PAIN

## 2023-09-21 ASSESSMENT — ENCOUNTER SYMPTOMS
NEUROLOGICAL NEGATIVE: 1
RESPIRATORY NEGATIVE: 1
CONSTITUTIONAL NEGATIVE: 1
CARDIOVASCULAR NEGATIVE: 1
ABDOMINAL PAIN: 1
EYES NEGATIVE: 1
MUSCULOSKELETAL NEGATIVE: 1
VOMITING: 1

## 2023-09-21 ASSESSMENT — PAIN SCALES - GENERAL: PAIN_LEVEL: 0

## 2023-09-21 ASSESSMENT — FIBROSIS 4 INDEX: FIB4 SCORE: 1.04

## 2023-09-21 NOTE — CONSULTS
Gastroenterology Initial Consult Note               Author:  Phuong Dee M.D. Date & Time Created: 9/21/2023 6:21 AM       Patient ID:  Name:             Johnathan Carter    YOB: 1985  Age:                 37 y.o.  male  MRN:               [id number]      Referring Provider:  Dr. Fausto Rondon        Presenting Chief Complaint:  hematemesis      History of Present Illness:    RECORDS FROM [facility] NOT AVAILABLE FOR REVIEW AND HAVE NOT BEEN SCANNED IN YET    This is a  37 y.o. male prisoner from [facility] who presented to our ER yesterday with hematemesis.  He gives a 1 day history of left-sided abdominal pain radiating to his back.  In ER yesterday, he was reported to be spitting up small amounts of blood but no melena.  HIs initial Hgb 16.6 and recheck was 14.3 (no IV fluids had been given per ER physician).  This am his Hgb 13.9  His CT abd/pelvis negative.    Per chart review:  He was admitted to  [facility] last week for hematemesis.  He was seen by [facility] and underwent EGD where he was noted to have a nonbleeding gastric diverticulum.  Just prior to discharge 4 days ago, he developed hematemesis again.  CTA of the abdomen was negative and the prior CT of chest and abdomen were negative.  His hemoglobin was stable despite large amounts of emesis.  He went for a second EGD where he had abnormal pink gelatinous material throughout the fundus without any evidence of active bleeding raising suspicion for an exogenous capsule ingestion.  His Hgb at discharge was 14    At [facility] he was found to be hyperthyroid with an undetectable TSH, elevated free T4 and he was started on propranolol and methimazole were noted to be hypertensive and was started on losartan and amlodipine.  Patient has not received those medicines at the time of presentation here.  On presentation his blood pressure in triage was 202/149, pulse 104        Review of  Systems:  Review of Systems   Constitutional: Negative.    HENT: Negative.     Eyes: Negative.    Respiratory: Negative.     Cardiovascular: Negative.    Gastrointestinal:  Positive for abdominal pain and vomiting. Negative for melena.   Genitourinary: Negative.    Musculoskeletal: Negative.    Skin: Negative.    Neurological: Negative.    Endo/Heme/Allergies: Negative.              Past Medical History:  History reviewed. No pertinent past medical history.  Active Hospital Problems    Diagnosis     GI bleed [K92.2]     Tachycardia [R00.0]     Hypertension [I10]     Hyperthyroidism [E05.90]          Past Surgical History:  History reviewed. No pertinent surgical history.        Hospital Medications:  Current Facility-Administered Medications   Medication Dose Frequency Provider Last Rate Last Admin    senna-docusate (Pericolace Or Senokot S) 8.6-50 MG per tablet 2 Tablet  2 Tablet BID Kendrick Rodriguez M.D.        And    polyethylene glycol/lytes (Miralax) PACKET 1 Packet  1 Packet QDAY PRN Kendrick Rodriguez M.D.        And    magnesium hydroxide (Milk Of Magnesia) suspension 30 mL  30 mL QDAY PRN Kendrick Rodriguez M.D.        And    bisacodyl (Dulcolax) suppository 10 mg  10 mg QDAY PRN Kendrick Rodriguez M.D.        lactated ringers infusion   Continuous Kendrick Rodriguez M.D. 100 mL/hr at 09/20/23 1913 New Bag at 09/20/23 1913    pantoprazole (Protonix) injection 40 mg  40 mg BID Kendrick Rodriguez M.D.   40 mg at 09/21/23 0515    hydrALAZINE (Apresoline) injection 20 mg  20 mg Q6HRS PRN Kendrick Rodriguez M.D.        hydrALAZINE (Apresoline) injection 10 mg  10 mg Once Kendrick Rodriguez M.D.        methimazole (Tapazole) tablet 5 mg  5 mg Q8HRS Kendrick Rodriguez M.D.   5 mg at 09/20/23 1914    oxyCODONE immediate-release (Roxicodone) tablet 5 mg  5 mg Q4HRS PRDONY Rodriguez M.D.   5 mg at 09/21/23 0330    morphine 4 MG/ML injection 2 mg  2 mg Q4HRS PRN Kendrick Rodriguez M.D.   2 mg at 09/21/23 0447   Last reviewed on 9/20/2023   7:00 PM by Irma Vazquez, EMT-Advanced       Current Outpatient Medications:  Medications Prior to Admission   Medication Sig Dispense Refill Last Dose    amLODIPine (NORVASC) 10 MG Tab Take 10 mg by mouth every day.   NEW RX at NOT STARTED    losartan (COZAAR) 50 MG Tab Take 50 mg by mouth every day.   NEW RX at NOT STARTED    methimazole (TAPAZOLE) 5 MG Tab Take 5 mg by mouth 3 times a day.   NEW RX at NOT STARTED    omeprazole (PRILOSEC) 20 MG delayed-release capsule Take 20 mg by mouth every day.   NEW RX at NOT STARTED    propranolol LA (INDERAL LA) 60 MG CAPSULE SR 24 HR Take 60 mg by mouth every day.   NEW RX at NOT STARTED         Medication Allergies:  Allergies   Allergen Reactions    Peanut Oil          Family Medical History:  History reviewed. No pertinent family history.      Social History:  Social History     Socioeconomic History    Marital status: Single     Spouse name: Not on file    Number of children: Not on file    Years of education: Not on file    Highest education level: Not on file   Occupational History    Not on file   Tobacco Use    Smoking status: Former     Types: Cigarettes    Smokeless tobacco: Never   Vaping Use    Vaping Use: Never used   Substance and Sexual Activity    Alcohol use: Not Currently    Drug use: Not Currently    Sexual activity: Not on file   Other Topics Concern    Not on file   Social History Narrative    Not on file     Social Determinants of Health     Financial Resource Strain: Not on file   Food Insecurity: Not on file   Transportation Needs: Not on file   Physical Activity: Not on file   Stress: Not on file   Social Connections: Not on file   Intimate Partner Violence: Not on file   Housing Stability: Not on file         Vital signs:  Weight/BMI: Body mass index is 23.71 kg/m².  BP (!) 176/115   Pulse 96   Temp 36.5 °C (97.7 °F) (Temporal)   Resp 18   Ht 1.829 m (6')   Wt 79.3 kg (174 lb 13.2 oz)   SpO2 97%   Vitals:    09/21/23 0330 09/21/23 0400  09/21/23 0447 09/21/23 0520   BP:  (!) 176/115     Pulse:  96     Resp: 20 19 20 18   Temp:  36.5 °C (97.7 °F)     TempSrc:  Temporal     SpO2:       Weight:  79.3 kg (174 lb 13.2 oz)     Height:         Oxygen Therapy:  Pulse Oximetry: 97 %, O2 (LPM): 0, O2 Delivery Device: None - Room Air    Intake/Output Summary (Last 24 hours) at 9/21/2023 0621  Last data filed at 9/21/2023 0400  Gross per 24 hour   Intake 1235.98 ml   Output 440 ml   Net 795.98 ml         Physical Exam:  Physical Exam  Constitutional:       General: He is not in acute distress.     Appearance: Normal appearance. He is normal weight. He is not ill-appearing, toxic-appearing or diaphoretic.   HENT:      Head: Normocephalic and atraumatic.      Nose: Nose normal.      Mouth/Throat:      Mouth: Mucous membranes are moist.   Eyes:      Pupils: Pupils are equal, round, and reactive to light.   Cardiovascular:      Rate and Rhythm: Regular rhythm.      Heart sounds: Normal heart sounds.   Pulmonary:      Effort: Pulmonary effort is normal.      Breath sounds: Normal breath sounds.   Abdominal:      General: Bowel sounds are normal.      Palpations: Abdomen is soft.      Comments: Tender to palpation along left abdomen, no mass appreciated   Musculoskeletal:         General: No swelling.      Cervical back: Neck supple.   Lymphadenopathy:      Cervical: No cervical adenopathy.   Skin:     General: Skin is warm and dry.   Neurological:      Mental Status: He is alert and oriented to person, place, and time.                 Labs:  Recent Labs     09/20/23  1239 09/21/23  0202   SODIUM 137 139   POTASSIUM 4.7 4.2   CHLORIDE 103 105   CO2 24 25   BUN 30* 33*   CREATININE 0.71 0.76   CALCIUM 9.8 9.0     Recent Labs     09/20/23  1239 09/21/23  0202   ALTSGPT 34 25   ASTSGOT 33 23   ALKPHOSPHAT 182* 151*   TBILIRUBIN 0.7 0.4   LIPASE 37  --    GLUCOSE 106* 93     Recent Labs     09/20/23  1239 09/20/23  1540 09/21/23  0202   WBC 6.5 5.4 6.3   NEUTSPOLYS  44.10 51.60 34.80*   LYMPHOCYTES 43.40* 37.50 52.50*   MONOCYTES 9.20 7.90 9.20   EOSINOPHILS 2.60 2.40 3.00   BASOPHILS 0.50 0.40 0.30   ASTSGOT 33  --  23   ALTSGPT 34  --  25   ALKPHOSPHAT 182*  --  151*   TBILIRUBIN 0.7  --  0.4     Recent Labs     09/20/23  1239 09/20/23  1540 09/20/23  2118 09/21/23  0202   RBC 5.88 5.15  --  5.00   HEMOGLOBIN 16.6 14.3 14.8 13.9*   HEMATOCRIT 48.0 41.9* 45.5 41.1*   PLATELETCT 195 158*  --  163*     Recent Results (from the past 24 hour(s))   CBC WITH DIFFERENTIAL    Collection Time: 09/20/23 12:39 PM   Result Value Ref Range    WBC 6.5 4.8 - 10.8 K/uL    RBC 5.88 4.70 - 6.10 M/uL    Hemoglobin 16.6 14.0 - 18.0 g/dL    Hematocrit 48.0 42.0 - 52.0 %    MCV 81.6 81.4 - 97.8 fL    MCH 28.2 27.0 - 33.0 pg    MCHC 34.6 32.3 - 36.5 g/dL    RDW 39.6 35.9 - 50.0 fL    Platelet Count 195 164 - 446 K/uL    MPV 12.0 9.0 - 12.9 fL    Neutrophils-Polys 44.10 44.00 - 72.00 %    Lymphocytes 43.40 (H) 22.00 - 41.00 %    Monocytes 9.20 0.00 - 13.40 %    Eosinophils 2.60 0.00 - 6.90 %    Basophils 0.50 0.00 - 1.80 %    Immature Granulocytes 0.20 0.00 - 0.90 %    Nucleated RBC 0.00 0.00 - 0.20 /100 WBC    Neutrophils (Absolute) 2.89 1.82 - 7.42 K/uL    Lymphs (Absolute) 2.84 1.00 - 4.80 K/uL    Monos (Absolute) 0.60 0.00 - 0.85 K/uL    Eos (Absolute) 0.17 0.00 - 0.51 K/uL    Baso (Absolute) 0.03 0.00 - 0.12 K/uL    Immature Granulocytes (abs) 0.01 0.00 - 0.11 K/uL    NRBC (Absolute) 0.00 K/uL   Comp Metabolic Panel    Collection Time: 09/20/23 12:39 PM   Result Value Ref Range    Sodium 137 135 - 145 mmol/L    Potassium 4.7 3.6 - 5.5 mmol/L    Chloride 103 96 - 112 mmol/L    Co2 24 20 - 33 mmol/L    Anion Gap 10.0 7.0 - 16.0    Glucose 106 (H) 65 - 99 mg/dL    Bun 30 (H) 8 - 22 mg/dL    Creatinine 0.71 0.50 - 1.40 mg/dL    Calcium 9.8 8.5 - 10.5 mg/dL    Correct Calcium 9.4 8.5 - 10.5 mg/dL    AST(SGOT) 33 12 - 45 U/L    ALT(SGPT) 34 2 - 50 U/L    Alkaline Phosphatase 182 (H) 30 - 99 U/L     Total Bilirubin 0.7 0.1 - 1.5 mg/dL    Albumin 4.5 3.2 - 4.9 g/dL    Total Protein 7.7 6.0 - 8.2 g/dL    Globulin 3.2 1.9 - 3.5 g/dL    A-G Ratio 1.4 g/dL   LIPASE    Collection Time: 09/20/23 12:39 PM   Result Value Ref Range    Lipase 37 11 - 82 U/L   ESTIMATED GFR    Collection Time: 09/20/23 12:39 PM   Result Value Ref Range    GFR (CKD-EPI) 121 >60 mL/min/1.73 m 2   CBC WITH DIFFERENTIAL    Collection Time: 09/20/23  3:40 PM   Result Value Ref Range    WBC 5.4 4.8 - 10.8 K/uL    RBC 5.15 4.70 - 6.10 M/uL    Hemoglobin 14.3 14.0 - 18.0 g/dL    Hematocrit 41.9 (L) 42.0 - 52.0 %    MCV 81.4 81.4 - 97.8 fL    MCH 27.8 27.0 - 33.0 pg    MCHC 34.1 32.3 - 36.5 g/dL    RDW 39.6 35.9 - 50.0 fL    Platelet Count 158 (L) 164 - 446 K/uL    MPV 12.3 9.0 - 12.9 fL    Neutrophils-Polys 51.60 44.00 - 72.00 %    Lymphocytes 37.50 22.00 - 41.00 %    Monocytes 7.90 0.00 - 13.40 %    Eosinophils 2.40 0.00 - 6.90 %    Basophils 0.40 0.00 - 1.80 %    Immature Granulocytes 0.20 0.00 - 0.90 %    Nucleated RBC 0.00 0.00 - 0.20 /100 WBC    Neutrophils (Absolute) 2.81 1.82 - 7.42 K/uL    Lymphs (Absolute) 2.04 1.00 - 4.80 K/uL    Monos (Absolute) 0.43 0.00 - 0.85 K/uL    Eos (Absolute) 0.13 0.00 - 0.51 K/uL    Baso (Absolute) 0.02 0.00 - 0.12 K/uL    Immature Granulocytes (abs) 0.01 0.00 - 0.11 K/uL    NRBC (Absolute) 0.00 K/uL   HEMOGLOBIN AND HEMATOCRIT    Collection Time: 09/20/23  9:18 PM   Result Value Ref Range    Hemoglobin 14.8 14.0 - 18.0 g/dL    Hematocrit 45.5 42.0 - 52.0 %   CBC WITH DIFFERENTIAL    Collection Time: 09/21/23  2:02 AM   Result Value Ref Range    WBC 6.3 4.8 - 10.8 K/uL    RBC 5.00 4.70 - 6.10 M/uL    Hemoglobin 13.9 (L) 14.0 - 18.0 g/dL    Hematocrit 41.1 (L) 42.0 - 52.0 %    MCV 82.2 81.4 - 97.8 fL    MCH 27.8 27.0 - 33.0 pg    MCHC 33.8 32.3 - 36.5 g/dL    RDW 39.8 35.9 - 50.0 fL    Platelet Count 163 (L) 164 - 446 K/uL    MPV 12.2 9.0 - 12.9 fL    Neutrophils-Polys 34.80 (L) 44.00 - 72.00 %    Lymphocytes  52.50 (H) 22.00 - 41.00 %    Monocytes 9.20 0.00 - 13.40 %    Eosinophils 3.00 0.00 - 6.90 %    Basophils 0.30 0.00 - 1.80 %    Immature Granulocytes 0.20 0.00 - 0.90 %    Nucleated RBC 0.00 0.00 - 0.20 /100 WBC    Neutrophils (Absolute) 2.20 1.82 - 7.42 K/uL    Lymphs (Absolute) 3.31 1.00 - 4.80 K/uL    Monos (Absolute) 0.58 0.00 - 0.85 K/uL    Eos (Absolute) 0.19 0.00 - 0.51 K/uL    Baso (Absolute) 0.02 0.00 - 0.12 K/uL    Immature Granulocytes (abs) 0.01 0.00 - 0.11 K/uL    NRBC (Absolute) 0.00 K/uL   Comp Metabolic Panel    Collection Time: 09/21/23  2:02 AM   Result Value Ref Range    Sodium 139 135 - 145 mmol/L    Potassium 4.2 3.6 - 5.5 mmol/L    Chloride 105 96 - 112 mmol/L    Co2 25 20 - 33 mmol/L    Anion Gap 9.0 7.0 - 16.0    Glucose 93 65 - 99 mg/dL    Bun 33 (H) 8 - 22 mg/dL    Creatinine 0.76 0.50 - 1.40 mg/dL    Calcium 9.0 8.5 - 10.5 mg/dL    Correct Calcium 9.2 8.5 - 10.5 mg/dL    AST(SGOT) 23 12 - 45 U/L    ALT(SGPT) 25 2 - 50 U/L    Alkaline Phosphatase 151 (H) 30 - 99 U/L    Total Bilirubin 0.4 0.1 - 1.5 mg/dL    Albumin 3.7 3.2 - 4.9 g/dL    Total Protein 6.3 6.0 - 8.2 g/dL    Globulin 2.6 1.9 - 3.5 g/dL    A-G Ratio 1.4 g/dL   ESTIMATED GFR    Collection Time: 09/21/23  2:02 AM   Result Value Ref Range    GFR (CKD-EPI) 118 >60 mL/min/1.73 m 2         Radiology Review:  CT-ABDOMEN-PELVIS WITH   Final Result      1.  CT of the abdomen and pelvis within normal limits. No acute abdominal or pelvic disease process is evident.            MDM (Data Review):   -Records reviewed and summarized in current documentation  -I personally reviewed and interpreted the laboratory results  -I personally reviewed the radiology images    Assessment/Recommendations:    Impression:   Hematemesis with negative workup in past 1 week at OSH   Mild anemia   HTN   Hyperthyroidism    Recs:  --His BP needs to be controlled before we take him for anesthesia.  --He is not having a significant GI bleed.  If he is having  hematemesis, the source should be esophageal/gastric/duodenal and he had 2 negative EGDs and CTA.  Unusual.    We will perform EGD this afternoon with BP controlled  Continue PPI  Further recommendations based upon findings  I gave him cl liq up until he has to be NPO for anesthesia          Phuong Dee M.D.    Addendum:    --His most recent .  Reviewed with Dr. Fernandez  --His Hgb is 14.1 this am.  He has not had witnessed eun hematemesis.   --I am going to cancel his EGD since I believe the yield is extremely low and the risk of anesthesia outweigh the benefit.  --If something changes, we can re-evaluate      Core Quality Measures   Reviewed items:  Labs, Medications and Radiology reports reviewed

## 2023-09-21 NOTE — PROCEDURES
OPERATIVE REPORT    PATIENT:   Johnathan Carter   1985       PREOPERATIVE DIAGNOSES/INDICATIONS: question of hematemesis    POSTOPERATIVE DIAGNOSIS: pinpoint site just distal to uvula with scant blood; gastric fundus diverticulum with no blood in GI tract    PROCEDURE:  ESOPHAGOGASTRODUODENOSCOPY    PHYSICIAN:  Phuong Dee MD    ANESTHESIA:  Per anesthesiologist.    LOCATION: [facility]    CONSENT:  OBTAINED. The risks, benefits and alternatives of the procedure were discussed in details. The risks include and are not limited to bleeding, infection, perforation, missed lesions, and sedations risks (cardiopulmonary compromise and allergic reaction to medications).    DESCRIPTION: The patient presented to the procedure room.  After routine checkup was performed, patient was brought into the endoscopy suite.  Patient was placed on his left lateral decubitus position. A bite block was placed in patient's mouth. Patient was sedated by anesthesia.  Vital signs were monitored throughout the procedure.  Oxygenation support was provided throughout the procedure. Upper endoscope was inserted into patient's mouth and advanced to the proximal jejunum under direct visualization.      Once the site was reached and examined, the upper endoscope was withdrawn.  Retroflexion was made within the stomach.  The stomach was decompressed, scope was withdrawn and the procedure was terminated.     The patient tolerated the procedure well.  There were no immediate complications.    OPERATIVE FINDINGS:  **Noted scant amount of blood from pinpoint lesion just distal to uvula    1. Esophagus: normal.  No blood in lumen  2. Stomach: 3 cm diverticulum in fundic cap with old food, no blood in lumen  3. Duodenum: normal.  No blood in lumen  4. Proximal jejunum: No blood in lumen    IMPRESSION/RECOMMENDATIONS:  OK for discharge  He can follow up with [facility] if ongoing abdominal pain  Discussed with Dr. Fernandez

## 2023-09-21 NOTE — PROGRESS NOTES
Assumed care of patient at bedside report from day RN. Updated on POC. Patient currently A & O x 4; on room air; up self with moderate complaints of acute pain. Assessment completed, pt medicated for pain. Call light within reach. Whiteboard updated. Fall precautions in place. Bed locked and in lowest position. All questions answered. No other needs indicated at this time. This pt is in custody, two officers at bedside, lowe extremities shackled to bed.

## 2023-09-21 NOTE — PROGRESS NOTES
4 Eyes Skin Assessment Completed by CARLOS Valles and DOUG Solis.    Head WDL  Ears WDL  Nose WDL  Mouth WDL  Neck WDL  Breast/Chest WDL  Shoulder Blades WDL  Spine WDL  (R) Arm/Elbow/Hand WDL  (L) Arm/Elbow/Hand WDL  Abdomen WDL  Groin WDL  Scrotum/Coccyx/Buttocks WDL  (R) Leg WDL  (L) Leg WDL  (R) Heel/Foot/Toe WDL  (L) Heel/Foot/Toe WDL          Devices In Places Tele Box, Blood Pressure Cuff, and Pulse Ox      Interventions In Place N/A    Possible Skin Injury No    Pictures Uploaded Into Epic N/A  Wound Consult Placed N/A  RN Wound Prevention Protocol Ordered No

## 2023-09-21 NOTE — DISCHARGE INSTRUCTIONS
Discharge Instructions per Carmine Fernandez M.D.    You were hospitalized for a bleed from the back of your mouth (posterior oropharynx).    DIET: Regular    ACTIVITY: Regular    DIAGNOSIS: Posterior Oropharynx Bleed

## 2023-09-21 NOTE — OR NURSING
Pt requesting to be bloodless. Dr. Dee notified. Bloodless consent form completed. Bloodless armband placed on patient. Bloodless stickers placed with chart.

## 2023-09-21 NOTE — ANESTHESIA PREPROCEDURE EVALUATION
Case: [id number] Date/Time: 09/21/23 1245    Procedure: GASTROSCOPY (Esophagus)    Anesthesia type: MAC    Pre-op diagnosis: hematemesis    Location: [facility] ROOM 26 / SURGERY SAME DAY [facility]    Surgeons: Phuong Dee M.D.          Relevant Problems   CARDIAC   (positive) Hypertension      ENDO   (positive) Hyperthyroidism       Physical Exam    Airway   Mallampati: II  TM distance: >3 FB  Neck ROM: full       Cardiovascular - normal exam  Rhythm: regular  Rate: normal  (-) murmur     Dental - normal exam           Pulmonary - normal exam  Breath sounds clear to auscultation     Abdominal    Neurological - normal exam                 Anesthesia Plan    ASA 2       Plan - general       Airway plan will be natural airway          Induction: intravenous    Postoperative Plan: Postoperative administration of opioids is intended.    Pertinent diagnostic labs and testing reviewed    Informed Consent:    Anesthetic plan and risks discussed with patient.    Use of blood products discussed with: patient whom consented to blood products.

## 2023-09-21 NOTE — ANESTHESIA POSTPROCEDURE EVALUATION
Patient: Johnathan Carter    Procedure Summary     Date: 09/21/23 Room / Location: [facility] ROOM 26 / SURGERY SAME DAY [facility]    Anesthesia Start: 1253 Anesthesia Stop: 1320    Procedure: GASTROSCOPY (Esophagus) Diagnosis: (pinpoint lesion to back of throat)    Surgeons: Phuong Dee M.D. Responsible Provider: Fausto Logan M.D.    Anesthesia Type: general ASA Status: 2          Final Anesthesia Type: general  Last vitals  BP   Blood Pressure: (!) 160/98    Temp   36.8 °C (98.3 °F)    Pulse   65   Resp   16    SpO2   95 %      Anesthesia Post Evaluation    Patient location during evaluation: PACU  Patient participation: complete - patient participated  Level of consciousness: awake and alert  Pain score: 0    Airway patency: patent  Anesthetic complications: no  Cardiovascular status: hemodynamically stable  Respiratory status: acceptable  Hydration status: euvolemic    PONV: none          No notable events documented.     Nurse Pain Score: 3 (NPRS)

## 2023-09-21 NOTE — ANESTHESIA TIME REPORT
Anesthesia Start and Stop Event Times     Date Time Event    9/21/2023 1232 Ready for Procedure     1253 Anesthesia Start     1320 Anesthesia Stop        Responsible Staff  09/21/23    Name Role Begin End    Fausto Logan M.D. Anesth 1253 1320        Overtime Reason:  no overtime (within assigned shift)    Comments:

## 2023-09-21 NOTE — DISCHARGE PLANNING
Patient rolled back to observation/outpatient status per attending   MD determination, Carmine Fernandez, and UR committee IPA secondary reviewer, Rebecca Chase. Condition code 44 completed.

## 2023-09-21 NOTE — DISCHARGE SUMMARY
"Discharge Summary    CHIEF COMPLAINT ON ADMISSION  Chief Complaint   Patient presents with    Blood in Vomit     Pt BIB law enforcement from prison. Pt started vomiting eun blood Wednesday and has not stopped. Pt was seen at [facility], [place] for same but was \"unable to find anything\" per pt.       Reason for Admission  Hematemesis    Admission Date  9/20/2023    CODE STATUS  Full Code    HPI & HOSPITAL COURSE  This is a 37 y.o. male prisoner with hyperthyroidism here with hematemesis and LUQ abdominal pain.    He has been recently hospitalized at [facility] [place] for similar presentation. Per report he underwent diagnostic EGD and CT A/P without apparent etiology for his hematemesis nor abdominal.    He presented with ongoing intermittent hematemesis. He was hemodynamically stable and hypertensive. Hgb was 16.6 on presentation. CMP was normal other than slight elevated BUN 30. Lipase was WNL. CT A/P was negative for acute abnormality. Propranolol was held for possible bleed, which resulted in uncontrolled hypertension pre-procedure and improved with restarting propranolol. GI was consulted and he underwent EGD which demonstrated no sources of bleeding in the nares, esophagus, stomach, nor duodenum. He was discovered to have a posterior oropharyngeal bleed distal to the uvula. Postprocedure he remained hemodynamically stable and tolerated a solid diet. Hgb remained stable during admission.    I spoke with the prison physician Dr. Hedrick. He will coordinate hyperthyroidism meds and follow up with [facility].    Therefore, he is discharged in good and stable condition to court or custody of law enforcement.    The patient recovered much more quickly than anticipated on admission.    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  [facility]  [address] [place], [place] [address]damari Maldonado [id number] [phone]  Schedule an appointment as soon as possible for a visit  As needed      MEDICATIONS ON DISCHARGE     Medication List        CONTINUE taking these medications        Instructions   amLODIPine 10 MG Tabs  Commonly known as: Norvasc   Take 10 mg by mouth every day.  Dose: 10 mg     losartan 50 MG Tabs  Commonly known as: Cozaar   Take 50 mg by mouth every day.  Dose: 50 mg     methimazole 5 MG Tabs  Commonly known as: Tapazole   Take 5 mg by mouth 3 times a day.  Dose: 5 mg     omeprazole 20 MG delayed-release capsule  Commonly known as: PriLOSEC   Take 20 mg by mouth every day.  Dose: 20 mg     propranolol LA 60 MG Cp24  Commonly known as: Inderal LA   Take 60 mg by mouth every day.  Dose: 60 mg              Allergies  Allergies   Allergen Reactions    Peanut Oil     Bloodless      Patients Preference         DIET  Orders Placed This Encounter   Procedures    Diet NPO Restrict to: Sips with Medications     Standing Status:   Standing     Number of Occurrences:   1     Order Specific Question:   Diet NPO Restrict to:     Answer:   Sips with Medications [3]       ACTIVITY  As tolerated.  Weight bearing as tolerated    CONSULTATIONS  Gastroenterology    PROCEDURES  OPERATIVE REPORT     PATIENT:   Johnathan Carter   1985         PREOPERATIVE DIAGNOSES/INDICATIONS: question of hematemesis     POSTOPERATIVE DIAGNOSIS: pinpoint site just distal to uvula with scant blood; gastric fundus diverticulum with no blood in GI tract     PROCEDURE:  ESOPHAGOGASTRODUODENOSCOPY     PHYSICIAN:  Phuong Dee MD     ANESTHESIA:  Per anesthesiologist.     LOCATION:  [facility]     CONSENT:  OBTAINED. The risks, benefits and alternatives of the procedure were discussed in details. The risks include and are not limited to bleeding, infection, perforation, missed lesions, and sedations risks (cardiopulmonary compromise and allergic reaction to medications).     DESCRIPTION: The patient presented to the procedure room.  After routine checkup was performed, patient was brought into the endoscopy suite.  Patient was placed on his left lateral decubitus position. A bite block was placed in patient's mouth. Patient was sedated by anesthesia.  Vital signs were monitored throughout the procedure.  Oxygenation support was provided throughout the procedure. Upper endoscope was inserted into patient's mouth and advanced to the proximal jejunum under direct visualization.       Once the site was reached and examined, the upper endoscope was withdrawn.  Retroflexion was made within the stomach.  The stomach was decompressed, scope was withdrawn and the procedure was terminated.      The patient tolerated the procedure well.  There were no immediate complications.     OPERATIVE FINDINGS:  **Noted scant amount of blood from pinpoint lesion just distal to uvula     1. Esophagus: normal.  No blood in lumen  2. Stomach: 3 cm diverticulum in fundic cap with old food, no blood in lumen  3. Duodenum: normal.  No blood in lumen  4. Proximal jejunum: No blood in lumen     IMPRESSION/RECOMMENDATIONS:  OK for discharge  He can follow up with [facility] if ongoing abdominal pain  Discussed with Dr. Fernandez          LABORATORY  Lab Results   Component Value Date    SODIUM 139 09/21/2023    POTASSIUM 4.2 09/21/2023    CHLORIDE 105 09/21/2023    CO2 25 09/21/2023    GLUCOSE 93 09/21/2023    BUN 33 (H) 09/21/2023    CREATININE 0.76 09/21/2023        Lab Results   Component Value Date    WBC 6.3 09/21/2023    HEMOGLOBIN 14.1 09/21/2023    HEMATOCRIT 41.2 (L) 09/21/2023    PLATELETCT 163 (L) 09/21/2023        Total time of  the discharge process exceeds 40 minutes.   No complaints

## 2023-09-21 NOTE — DISCHARGE PLANNING
Code 44 form 2 was delivered to the patient's bedside to notify him that he is in observation status.

## 2023-09-21 NOTE — OR NURSING
*This is a Running Note*    1317 Pt to PACU QR3 from OR. Bedside report from anesthesiologist and RN.  Attached to monitoring, VSS, breathing is calm and unlabored, Patient is asleep currently. Remains on 4 L oxygen via mask with an Oral airway in place.      1344 Pt denies pain or nausea at this time.    Pt Now on RA, and has had some water, tolerating well.    1359 Pt has had some ice cream and tolerating well.    1403 pt in route to room with RN and CNA, Pt is on tele box, monitor room was called and pt was seen, RN made aware.

## 2023-09-21 NOTE — CARE PLAN
The patient is Stable - Low risk of patient condition declining or worsening    Shift Goals  Clinical Goals: manage pain, rest, trend labs  Patient Goals: eat, sleep  Family Goals: NA    Progress made toward(s) clinical / shift goals:    Problem: Pain - Standard  Goal: Alleviation of pain or a reduction in pain to the patient’s comfort goal  Description: Target End Date:  Prior to discharge or change in level of care    Document on Vitals flowsheet    1.  Document pain using the appropriate pain scale per order or unit policy  2.  Educate and implement non-pharmacologic comfort measures (i.e. relaxation, distraction, massage, cold/heat therapy, etc.)  3.  Pain management medications as ordered  4.  Reassess pain after pain med administration per policy  5.  If opiods administered assess patient's response to pain medication is appropriate per POSS sedation scale  6.  Follow pain management plan developed in collaboration with patient and interdisciplinary team (including palliative care or pain specialists if applicable)  Outcome: Progressing       Patient is not progressing towards the following goals:

## (undated) DEVICE — FILM CASSETTE ENDO

## (undated) DEVICE — SENSOR OXIMETER ADULT SPO2 RD SET (20EA/BX)

## (undated) DEVICE — LACTATED RINGERS INJ 1000 ML - (14EA/CA 60CA/PF)

## (undated) DEVICE — TUBE CONNECTING SUCTION - CLEAR PLASTIC STERILE 72 IN (50EA/CA)

## (undated) DEVICE — SET LEADWIRE 5 LEAD BEDSIDE DISPOSABLE ECG (1SET OF 5/EA)

## (undated) DEVICE — MASK PANORAMIC OXYGEN PRO2 (30EA/CA)

## (undated) DEVICE — TOWEL STOP TIMEOUT SAFETY FLAG (40EA/CA)

## (undated) DEVICE — BUTTON ENDOSCOPY DISPOSABLE

## (undated) DEVICE — SET EXTENSION WITH 2 PORTS (48EA/CA) ***PART #2C8610 IS A SUBSTITUTE*****

## (undated) DEVICE — PORT AUXILLARY WATER (50EA/BX)

## (undated) DEVICE — WATER IRRIGATION STERILE 1000ML (12EA/CA)

## (undated) DEVICE — KIT CUSTOM PROCEDURE SINGLE FOR ENDO  (15/CA)

## (undated) DEVICE — NEPTUNE 4 PORT MANIFOLD - (20/PK)

## (undated) DEVICE — CANISTER SUCTION RIGID RED 1500CC (40EA/CA)

## (undated) DEVICE — TUBING CLEARLINK DUO-VENT - C-FLO (48EA/CA)

## (undated) DEVICE — BITE BLOCK, DISP.